# Patient Record
Sex: MALE | Race: WHITE | NOT HISPANIC OR LATINO | URBAN - METROPOLITAN AREA
[De-identification: names, ages, dates, MRNs, and addresses within clinical notes are randomized per-mention and may not be internally consistent; named-entity substitution may affect disease eponyms.]

---

## 2021-07-16 ENCOUNTER — LAB OUTSIDE AN ENCOUNTER (OUTPATIENT)
Dept: URBAN - METROPOLITAN AREA TELEHEALTH 2 | Facility: TELEHEALTH | Age: 36
End: 2021-07-16

## 2021-07-16 ENCOUNTER — OFFICE VISIT (OUTPATIENT)
Dept: URBAN - METROPOLITAN AREA TELEHEALTH 2 | Facility: TELEHEALTH | Age: 36
End: 2021-07-16
Payer: COMMERCIAL

## 2021-07-16 DIAGNOSIS — K86.0 ALCOHOL-INDUCED CHRONIC PANCREATITIS: ICD-10-CM

## 2021-07-16 DIAGNOSIS — R79.89 ELEVATED LIVER FUNCTION TESTS: ICD-10-CM

## 2021-07-16 DIAGNOSIS — R93.2 ABNORMAL FINDING ON IMAGING OF LIVER: ICD-10-CM

## 2021-07-16 PROCEDURE — 83516 IMMUNOASSAY NONANTIBODY: CPT | Performed by: INTERNAL MEDICINE

## 2021-07-16 PROCEDURE — 99205 OFFICE O/P NEW HI 60 MIN: CPT | Performed by: INTERNAL MEDICINE

## 2021-07-16 RX ORDER — NITROFURANTOIN 25 MG/5ML
SUSPENSION ORAL
Qty: 0 | Refills: 0 | Status: ACTIVE | COMMUNITY
Start: 1900-01-01

## 2021-07-16 NOTE — HPI-OTHER HISTORIES
August 22, 2018 The patient is a 33 year old /White male , who presents on referral from Ramya Taylor MD , for a gastroenterology 2nd opinion evaluation for established chronic pancreatitis. A copy of this document will be sent to the referring provider. The patient has symptoms of abdominal pain, weight loss, and diarrhea. Patient has not records from [place] present for me to review. States he has moderate to severe epogastric and left sided abdominal pain and nausea. Also with 2-5 loose stools daily.  He has not had a cholecystectomy, and has a prior use of alcohol as reviewed in social history.  The patient was diagnosed after presenting to [place] ED 12/2017. He states he has never been admitted to the hospital. ED referred him to [facility] [place]. He states he had tests ordered including an ultrasound and bloodwork. He was placed on pancreatic enzymes but he states he is having worse abdominal pain. he admits drinking 1 beer last week. Last imaging was a CT through the ED 8/8. He has not had an EUS. He has not seen pain management- PMD giving him oxycodone for pain management. He notes being told he has a portal vein thrombus and is scheduled to see Dr Piedra about this on Monday.

## 2021-07-16 NOTE — HPI-TODAY'S VISIT:
Mr. Kwaku Mallory is a 35 year old male here for chronic pancreatitis. He first started having pancreatitis in 2017. This was thought to be related to alcohol. In September 2018, he had severe hemorrhagic pancreatitis with fluid collections. He required IR embolization and drains. He has done well since this time. He has been sober for about a year. He is no longer on pancreatic enzymes. He denies any abdominal pain or diarrhea. He was hospatilized for sepsis in March. These outside records were reviewed. He had a CT scan at that time that scattered calcifications of the pancreas and mild pancreatic ductal dilatation. It also suggested a nodular liver, enlarged spleen and paraesophageal varices. He has never been told he has cirrhosis. His , AST 98, , bilirubin 0.3, and platelets 92.  Patient denies any family history of liver disease. Patient's family is local. He recalls being told in the past he had hepatitis. Patient denies any abdominal pain, swelling, mental changes, or increase in bleeding. He does bruise easily. CS

## 2021-08-22 ENCOUNTER — WEB ENCOUNTER (OUTPATIENT)
Dept: URBAN - METROPOLITAN AREA CLINIC 40 | Facility: CLINIC | Age: 36
End: 2021-08-22

## 2021-09-17 ENCOUNTER — OFFICE VISIT (OUTPATIENT)
Dept: URBAN - METROPOLITAN AREA MEDICAL CENTER 1 | Facility: MEDICAL CENTER | Age: 36
End: 2021-09-17
Payer: COMMERCIAL

## 2021-09-17 DIAGNOSIS — K31.89 ACQUIRED DEFORMITY OF DUODENUM: ICD-10-CM

## 2021-09-17 DIAGNOSIS — R93.3 ABN FINDINGS-GI TRACT: ICD-10-CM

## 2021-09-17 PROCEDURE — 43235 EGD DIAGNOSTIC BRUSH WASH: CPT | Performed by: INTERNAL MEDICINE

## 2021-10-20 ENCOUNTER — OFFICE VISIT (OUTPATIENT)
Dept: URBAN - NONMETROPOLITAN AREA CLINIC 2 | Facility: CLINIC | Age: 36
End: 2021-10-20

## 2021-12-01 ENCOUNTER — WEB ENCOUNTER (OUTPATIENT)
Dept: URBAN - NONMETROPOLITAN AREA CLINIC 2 | Facility: CLINIC | Age: 36
End: 2021-12-01

## 2021-12-01 ENCOUNTER — OFFICE VISIT (OUTPATIENT)
Dept: URBAN - NONMETROPOLITAN AREA CLINIC 2 | Facility: CLINIC | Age: 36
End: 2021-12-01
Payer: COMMERCIAL

## 2021-12-01 ENCOUNTER — LAB OUTSIDE AN ENCOUNTER (OUTPATIENT)
Dept: URBAN - NONMETROPOLITAN AREA CLINIC 2 | Facility: CLINIC | Age: 36
End: 2021-12-01

## 2021-12-01 VITALS
SYSTOLIC BLOOD PRESSURE: 114 MMHG | HEART RATE: 72 BPM | TEMPERATURE: 97.8 F | WEIGHT: 138.6 LBS | HEIGHT: 67 IN | BODY MASS INDEX: 21.75 KG/M2 | DIASTOLIC BLOOD PRESSURE: 77 MMHG

## 2021-12-01 DIAGNOSIS — K86.0 ALCOHOL-INDUCED CHRONIC PANCREATITIS: ICD-10-CM

## 2021-12-01 DIAGNOSIS — R79.89 ELEVATED LIVER FUNCTION TESTS: ICD-10-CM

## 2021-12-01 DIAGNOSIS — K86.89 CALCULUS OF PANCREATIC DUCT: ICD-10-CM

## 2021-12-01 DIAGNOSIS — R93.2 ABNORMAL FINDING ON IMAGING OF LIVER: ICD-10-CM

## 2021-12-01 PROCEDURE — 99214 OFFICE O/P EST MOD 30 MIN: CPT | Performed by: NURSE PRACTITIONER

## 2021-12-01 RX ORDER — NITROFURANTOIN 25 MG/5ML
SUSPENSION ORAL
Qty: 0 | Refills: 0 | Status: ACTIVE | COMMUNITY
Start: 1900-01-01

## 2021-12-01 NOTE — HPI-OTHER HISTORIES
August 22, 2018 The patient is a 33 year old /White male , who presents on referral from Ramya Taylor MD , for a gastroenterology 2nd opinion evaluation for established chronic pancreatitis. A copy of this document will be sent to the referring provider. The patient has symptoms of abdominal pain, weight loss, and diarrhea. Patient has not records from [place] present for me to review. States he has moderate to severe epogastric and left sided abdominal pain and nausea. Also with 2-5 loose stools daily.  He has not had a cholecystectomy, and has a prior use of alcohol as reviewed in social history.  The patient was diagnosed after presenting to [place] ED 12/2017. He states he has never been admitted to the hospital. ED referred him to [facility] [place]. He states he had tests ordered including an ultrasound and bloodwork. He was placed on pancreatic enzymes but he states he is having worse abdominal pain. he admits drinking 1 beer last week. Last imaging was a CT through the ED 8/8. He has not had an EUS. He has not seen pain management- PMD giving him oxycodone for pain management. He notes being told he has a portal vein thrombus and is scheduled to see Dr Piedra about this on Monday.   7/16/2021 Mr. Kwaku Mallory is a 35 year old male here for chronic pancreatitis. He first started having pancreatitis in 2017. This was thought to be related to alcohol. In September 2018, he had severe hemorrhagic pancreatitis with fluid collections. He required IR embolization and drains. He has done well since this time. He has been sober for about a year. He is no longer on pancreatic enzymes. He denies any abdominal pain or diarrhea. He was hospatilized for sepsis in March. These outside records were reviewed. He had a CT scan at that time that scattered calcifications of the pancreas and mild pancreatic ductal dilatation. It also suggested a nodular liver, enlarged spleen and paraesophageal varices. He has never been told he has cirrhosis. His , AST 98, , bilirubin 0.3, and platelets 92.  Patient denies any family history of liver disease. Patient's family is local. He recalls being told in the past he had hepatitis. Patient denies any abdominal pain, swelling, mental changes, or increase in bleeding. He does bruise easily. CS

## 2021-12-01 NOTE — HPI-TODAY'S VISIT:
12/1/2021 Mr. Kwaku Mallory is here for f/u of chronic pancreatitis and imaging suggestive of cirrhosis. He first started having pancreatitis in 2017. This was thought to be related to alcohol. In September 2018, he had severe hemorrhagic pancreatitis with fluid collections. He required IR embolization and drains. He has done well since this time.  He is no longer on pancreatic enzymes. He denies any abdominal pain or diarrhea. He has been sober for over a year. He was hospatilized for sepsis in March. He had a CT scan at that time that scattered calcifications of the pancreas and mild pancreatic ductal dilatation. It also suggested a nodular liver, enlarged spleen and paraesophageal varices. Chronic liver work up was ordered, repeat CT scan, and EGD for varix screening. He did not get the labs. HIs CT scan scan pancreatic ductal dilatation with a pancreatic ductal stone. There were no masses or lesions in the liver. There was no comment on the liver looking nodular or cirrhotic. Patient denies any abdominal pain, swelling, mental changes, or increase in bleeding. He does bruise easily. CS

## 2021-12-03 LAB
ACTIN (SMOOTH MUSCLE) ANTIBODY: 7
AFP, SERUM, TUMOR MARKER: 2.4
ALBUMIN: 4.8
ALKALINE PHOSPHATASE: 165
ALPHA-1-ANTITRYPSIN, SERUM: 123
ALT (SGPT): 19
ANA DIRECT: NEGATIVE
AST (SGOT): 27
BILIRUBIN, DIRECT: 0.14
BILIRUBIN, TOTAL: 0.4
BUN/CREATININE RATIO: 21
BUN: 15
CARBON DIOXIDE, TOTAL: 24
CERULOPLASMIN: 23.1
CHLORIDE: 102
CREATININE: 0.72
EGFR IF AFRICN AM: 141
EGFR IF NONAFRICN AM: 122
GLUCOSE: 69
HBSAG SCREEN: NEGATIVE
HCV AB: 0.3
HEMATOCRIT: 45
HEMOGLOBIN: 15.6
HEP A AB, IGM: NEGATIVE
HEP A AB, TOTAL: NEGATIVE
HEP B CORE AB, TOT: NEGATIVE
HEP B SURFACE AB, QUAL: REACTIVE
INR: 1.1
INTERPRETATION: (no result)
INTERPRETATION:: (no result)
IRON BIND.CAP.(TIBC): 364
IRON SATURATION: 39
IRON: 143
MCH: 32.1
MCHC: 34.7
MCV: 93
MITOCHONDRIAL (M2) ANTIBODY: <20
NRBC: (no result)
PLATELETS: 119
POTASSIUM: 4.1
PROTEIN, TOTAL: 7
PROTHROMBIN TIME: 11.1
RBC: 4.86
RDW: 12.8
RFX TO HBC IGM: (no result)
SODIUM: 140
T-TRANSGLUTAMINASE (TTG) IGA: <2
TSH: 3.43
UIBC: 221
WBC: 4.5

## 2021-12-17 ENCOUNTER — OFFICE VISIT (OUTPATIENT)
Dept: URBAN - METROPOLITAN AREA MEDICAL CENTER 1 | Facility: MEDICAL CENTER | Age: 36
End: 2021-12-17
Payer: COMMERCIAL

## 2021-12-17 DIAGNOSIS — K86.89 ACUTE PANCREATIC FLUID COLLECTION: ICD-10-CM

## 2021-12-17 PROCEDURE — 43274 ERCP DUCT STENT PLACEMENT: CPT | Performed by: INTERNAL MEDICINE

## 2021-12-17 PROCEDURE — 43237 ENDOSCOPIC US EXAM ESOPH: CPT | Performed by: INTERNAL MEDICINE

## 2021-12-17 PROCEDURE — 74329 X-RAY FOR PANCREAS ENDOSCOPY: CPT | Performed by: INTERNAL MEDICINE

## 2021-12-27 ENCOUNTER — TELEPHONE ENCOUNTER (OUTPATIENT)
Dept: URBAN - METROPOLITAN AREA CLINIC 23 | Facility: CLINIC | Age: 36
End: 2021-12-27

## 2021-12-28 ENCOUNTER — OUT OF OFFICE VISIT (OUTPATIENT)
Dept: URBAN - METROPOLITAN AREA MEDICAL CENTER 1 | Facility: MEDICAL CENTER | Age: 36
End: 2021-12-28
Payer: COMMERCIAL

## 2021-12-28 DIAGNOSIS — K85.20 ALCOHOL INDUCED ACUTE PANCREATITIS: ICD-10-CM

## 2021-12-28 DIAGNOSIS — K59.09 CHANGE IN BOWEL MOVEMENTS INTERMITTENT CONSTIPATION. URGENCY IN THE MORNING.: ICD-10-CM

## 2021-12-28 DIAGNOSIS — K86.89 ACUTE PANCREATIC FLUID COLLECTION: ICD-10-CM

## 2021-12-28 DIAGNOSIS — K86.0 ALCOHOL-INDUCED CHRONIC PANCREATITIS: ICD-10-CM

## 2021-12-28 PROCEDURE — 99222 1ST HOSP IP/OBS MODERATE 55: CPT | Performed by: INTERNAL MEDICINE

## 2021-12-28 PROCEDURE — G8427 DOCREV CUR MEDS BY ELIG CLIN: HCPCS | Performed by: INTERNAL MEDICINE

## 2022-01-18 ENCOUNTER — OFFICE VISIT (OUTPATIENT)
Dept: URBAN - NONMETROPOLITAN AREA CLINIC 13 | Facility: CLINIC | Age: 37
End: 2022-01-18

## 2022-01-18 RX ORDER — NITROFURANTOIN 25 MG/5ML
SUSPENSION ORAL
Qty: 0 | Refills: 0 | COMMUNITY
Start: 1900-01-01

## 2022-01-18 NOTE — HPI-OTHER HISTORIES
August 22, 2018 The patient is a 33 year old /White male , who presents on referral from Ramya Taylor MD , for a gastroenterology 2nd opinion evaluation for established chronic pancreatitis. A copy of this document will be sent to the referring provider. The patient has symptoms of abdominal pain, weight loss, and diarrhea. Patient has not records from [place] present for me to review. States he has moderate to severe epogastric and left sided abdominal pain and nausea. Also with 2-5 loose stools daily.  He has not had a cholecystectomy, and has a prior use of alcohol as reviewed in social history.  The patient was diagnosed after presenting to [place] ED 12/2017. He states he has never been admitted to the hospital. ED referred him to [facility]. He states he had tests ordered including an ultrasound and bloodwork. He was placed on pancreatic enzymes but he states he is having worse abdominal pain. he admits drinking 1 beer last week. Last imaging was a CT through the ED 8/8. He has not had an EUS. He has not seen pain management- PMD giving him oxycodone for pain management. He notes being told he has a portal vein thrombus and is scheduled to see Dr Piedra about this on Monday.   7/16/2021 Mr. Kwaku Mallory is a 35 year old male here for chronic pancreatitis. He first started having pancreatitis in 2017. This was thought to be related to alcohol. In September 2018, he had severe hemorrhagic pancreatitis with fluid collections. He required IR embolization and drains. He has done well since this time. He has been sober for about a year. He is no longer on pancreatic enzymes. He denies any abdominal pain or diarrhea. He was hospatilized for sepsis in March. These outside records were reviewed. He had a CT scan at that time that scattered calcifications of the pancreas and mild pancreatic ductal dilatation. It also suggested a nodular liver, enlarged spleen and paraesophageal varices. He has never been told he has cirrhosis. His , AST 98, , bilirubin 0.3, and platelets 92.  Patient denies any family history of liver disease. Patient's family is local. He recalls being told in the past he had hepatitis. Patient denies any abdominal pain, swelling, mental changes, or increase in bleeding. He does bruise easily. CS

## 2022-02-18 ENCOUNTER — OFFICE VISIT (OUTPATIENT)
Dept: URBAN - METROPOLITAN AREA TELEHEALTH 2 | Facility: TELEHEALTH | Age: 37
End: 2022-02-18

## 2022-02-18 RX ORDER — NITROFURANTOIN 25 MG/5ML
SUSPENSION ORAL
Qty: 0 | Refills: 0 | COMMUNITY
Start: 1900-01-01

## 2022-02-18 NOTE — HPI-OTHER HISTORIES
August 22, 2018 The patient is a 33 year old /White male , who presents on referral from Ramya Taylor MD , for a gastroenterology 2nd opinion evaluation for established chronic pancreatitis. A copy of this document will be sent to the referring provider. The patient has symptoms of abdominal pain, weight loss, and diarrhea. Patient has not records from [place] present for me to review. States he has moderate to severe epogastric and left sided abdominal pain and nausea. Also with 2-5 loose stools daily.  He has not had a cholecystectomy, and has a prior use of alcohol as reviewed in social history.  The patient was diagnosed after presenting to [place] ED 12/2017. He states he has never been admitted to the hospital. ED referred him to [facility] [place]. He states he had tests ordered including an ultrasound and bloodwork. He was placed on pancreatic enzymes but he states he is having worse abdominal pain. he admits drinking 1 beer last week. Last imaging was a CT through the ED 8/8. He has not had an EUS. He has not seen pain management- PMD giving him oxycodone for pain management. He notes being told he has a portal vein thrombus and is scheduled to see Dr Piedra about this on Monday.   7/16/2021 Mr. Kwaku Mallory is a 35 year old male here for chronic pancreatitis. He first started having pancreatitis in 2017. This was thought to be related to alcohol. In September 2018, he had severe hemorrhagic pancreatitis with fluid collections. He required IR embolization and drains. He has done well since this time. He has been sober for about a year. He is no longer on pancreatic enzymes. He denies any abdominal pain or diarrhea. He was hospatilized for sepsis in March. These outside records were reviewed. He had a CT scan at that time that scattered calcifications of the pancreas and mild pancreatic ductal dilatation. It also suggested a nodular liver, enlarged spleen and paraesophageal varices. He has never been told he has cirrhosis. His , AST 98, , bilirubin 0.3, and platelets 92.  Patient denies any family history of liver disease. Patient's family is local. He recalls being told in the past he had hepatitis. Patient denies any abdominal pain, swelling, mental changes, or increase in bleeding. He does bruise easily. CS

## 2022-04-19 ENCOUNTER — OFFICE VISIT (OUTPATIENT)
Dept: URBAN - NONMETROPOLITAN AREA CLINIC 13 | Facility: CLINIC | Age: 37
End: 2022-04-19
Payer: COMMERCIAL

## 2022-04-19 ENCOUNTER — LAB OUTSIDE AN ENCOUNTER (OUTPATIENT)
Dept: URBAN - NONMETROPOLITAN AREA CLINIC 13 | Facility: CLINIC | Age: 37
End: 2022-04-19

## 2022-04-19 DIAGNOSIS — K86.89 CALCULUS OF PANCREATIC DUCT: ICD-10-CM

## 2022-04-19 DIAGNOSIS — K86.0 ALCOHOL-INDUCED CHRONIC PANCREATITIS: ICD-10-CM

## 2022-04-19 DIAGNOSIS — R79.89 ELEVATED LIVER FUNCTION TESTS: ICD-10-CM

## 2022-04-19 PROCEDURE — 99214 OFFICE O/P EST MOD 30 MIN: CPT | Performed by: INTERNAL MEDICINE

## 2022-04-19 RX ORDER — NITROFURANTOIN 25 MG/5ML
SUSPENSION ORAL
Qty: 0 | Refills: 0 | COMMUNITY
Start: 1900-01-01

## 2022-04-19 NOTE — HPI-OTHER HISTORIES
August 22, 2018 The patient is a 33 year old /White male , who presents on referral from Ramya Taylor MD , for a gastroenterology 2nd opinion evaluation for established chronic pancreatitis. A copy of this document will be sent to the referring provider. The patient has symptoms of abdominal pain, weight loss, and diarrhea. Patient has not records from [place] present for me to review. States he has moderate to severe epogastric and left sided abdominal pain and nausea. Also with 2-5 loose stools daily.  He has not had a cholecystectomy, and has a prior use of alcohol as reviewed in social history.  The patient was diagnosed after presenting to [place] ED 12/2017. He states he has never been admitted to the hospital. ED referred him to [facility]. He states he had tests ordered including an ultrasound and bloodwork. He was placed on pancreatic enzymes but he states he is having worse abdominal pain. he admits drinking 1 beer last week. Last imaging was a CT through the ED 8/8. He has not had an EUS. He has not seen pain management- PMD giving him oxycodone for pain management. He notes being told he has a portal vein thrombus and is scheduled to see Dr Piedra about this on Monday.   7/16/2021 Mr. Kwaku Mallory is a 35 year old male here for chronic pancreatitis. He first started having pancreatitis in 2017. This was thought to be related to alcohol. In September 2018, he had severe hemorrhagic pancreatitis with fluid collections. He required IR embolization and drains. He has done well since this time. He has been sober for about a year. He is no longer on pancreatic enzymes. He denies any abdominal pain or diarrhea. He was hospatilized for sepsis in March. These outside records were reviewed. He had a CT scan at that time that scattered calcifications of the pancreas and mild pancreatic ductal dilatation. It also suggested a nodular liver, enlarged spleen and paraesophageal varices. He has never been told he has cirrhosis. His , AST 98, , bilirubin 0.3, and platelets 92.  Patient denies any family history of liver disease. Patient's family is local. He recalls being told in the past he had hepatitis. Patient denies any abdominal pain, swelling, mental changes, or increase in bleeding. He does bruise easily.    9/2021 EGD: no esophageal varices, mildly prominent veins in stomach possible gastric varices  12/1/2021 Mr. Kwaku Mallory is here for f/u of chronic pancreatitis and imaging suggestive of cirrhosis. He first started having pancreatitis in 2017. This was thought to be related to alcohol. In September 2018, he had severe hemorrhagic pancreatitis with fluid collections. He required IR embolization and drains. He has done well since this time.  He is no longer on pancreatic enzymes. He denies any abdominal pain or diarrhea. He has been sober for over a year. He was hospatilized for sepsis in March. He had a CT scan at that time that scattered calcifications of the pancreas and mild pancreatic ductal dilatation. It also suggested a nodular liver, enlarged spleen and paraesophageal varices. Chronic liver work up was ordered, repeat CT scan, and EGD for varix screening. He did not get the labs. HIs CT scan scan pancreatic ductal dilatation with a pancreatic ductal stone. There were no masses or lesions in the liver. There was no comment on the liver looking nodular or cirrhotic. Patient denies any abdominal pain, swelling, mental changes, or increase in bleeding. He does bruise easily. CS   12/17/2021 EUS: endoscopic changes suggesting mild to moderate chronic pancreatitis, pancreatic stone in body of pancreas, dilated duct in body 6mm, peripancreatic varices ERCP: tortuous narrowed proximal pancreatic duct concerning for stricture, stent placed 5mm x 5cm single pigtail placed

## 2022-04-19 NOTE — HPI-TODAY'S VISIT:
4/19/2022 Mr. Kwaku Mallory is here for f/u of chronic pancreatitis.  He first started having pancreatitis in 2017 secondary to alcohol. In September 2018, he had severe hemorrhagic pancreatitis with fluid collections. He required IR embolization and drains. In March 2021, he had a CT scan with scattered calcifications of the pancreas and mild pancreatic ductal dilatation. It also suggested a nodular liver, enlarged spleen and paraesophageal varices.  CT scan scan pancreatic ductal dilatation with a pancreatic ductal stone. There were no masses or lesions in the liver. EGD with mild promiment gastric varices. Chronic work up negative. US elastography negative for cirrhossis. Due to stones, ERCP and EUS was done with stenting. He then developed pancreatitis. He has been doing well since January. He denies any abdominal pain or jaundice. He is taking zenpep with only some of his meals. it  has a SE for constipation. he is working full time at [employer]. Overall, he is feeling well. CS

## 2022-05-13 ENCOUNTER — OFFICE VISIT (OUTPATIENT)
Dept: URBAN - METROPOLITAN AREA MEDICAL CENTER 1 | Facility: MEDICAL CENTER | Age: 37
End: 2022-05-13
Payer: COMMERCIAL

## 2022-05-13 DIAGNOSIS — K86.89 ACUTE PANCREATIC FLUID COLLECTION: ICD-10-CM

## 2022-05-13 DIAGNOSIS — Z46.59 ENCOUNTER FOR FITTING AND ADJUSTMENT OF OTHER GASTROINTESTINAL APPLIANCE AND DEVICE: ICD-10-CM

## 2022-05-13 PROCEDURE — 74329 X-RAY FOR PANCREAS ENDOSCOPY: CPT | Performed by: INTERNAL MEDICINE

## 2022-05-13 PROCEDURE — 43275 ERCP REMOVE FORGN BODY DUCT: CPT | Performed by: INTERNAL MEDICINE

## 2022-05-13 RX ORDER — NITROFURANTOIN 25 MG/5ML
SUSPENSION ORAL
Qty: 0 | Refills: 0 | COMMUNITY
Start: 1900-01-01

## 2022-05-23 ENCOUNTER — TELEPHONE ENCOUNTER (OUTPATIENT)
Dept: URBAN - NONMETROPOLITAN AREA CLINIC 13 | Facility: CLINIC | Age: 37
End: 2022-05-23

## 2022-05-23 RX ORDER — PANCRELIPASE LIPASE, PANCRELIPASE PROTEASE, PANCRELIPASE AMYLASE 20000; 63000; 84000 [USP'U]/1; [USP'U]/1; [USP'U]/1
2 PILLS WITH EACH MEAL AND ONE WITH EACH SNACK CAPSULE, DELAYED RELEASE ORAL
Qty: 240 | Refills: 6

## 2022-06-09 ENCOUNTER — OFFICE VISIT (OUTPATIENT)
Dept: URBAN - NONMETROPOLITAN AREA CLINIC 13 | Facility: CLINIC | Age: 37
End: 2022-06-09

## 2022-07-07 ENCOUNTER — OFFICE VISIT (OUTPATIENT)
Dept: URBAN - NONMETROPOLITAN AREA CLINIC 13 | Facility: CLINIC | Age: 37
End: 2022-07-07
Payer: COMMERCIAL

## 2022-07-07 ENCOUNTER — LAB OUTSIDE AN ENCOUNTER (OUTPATIENT)
Dept: URBAN - NONMETROPOLITAN AREA CLINIC 13 | Facility: CLINIC | Age: 37
End: 2022-07-07

## 2022-07-07 VITALS
BODY MASS INDEX: 20.91 KG/M2 | TEMPERATURE: 97.9 F | HEIGHT: 67 IN | SYSTOLIC BLOOD PRESSURE: 108 MMHG | DIASTOLIC BLOOD PRESSURE: 69 MMHG | WEIGHT: 133.2 LBS | HEART RATE: 65 BPM

## 2022-07-07 DIAGNOSIS — K86.0 ALCOHOL-INDUCED CHRONIC PANCREATITIS: ICD-10-CM

## 2022-07-07 DIAGNOSIS — K86.89 CALCULUS OF PANCREATIC DUCT: ICD-10-CM

## 2022-07-07 DIAGNOSIS — R79.89 ELEVATED LIVER FUNCTION TESTS: ICD-10-CM

## 2022-07-07 PROCEDURE — 99214 OFFICE O/P EST MOD 30 MIN: CPT | Performed by: NURSE PRACTITIONER

## 2022-07-07 RX ORDER — PANCRELIPASE LIPASE, PANCRELIPASE PROTEASE, PANCRELIPASE AMYLASE 20000; 63000; 84000 [USP'U]/1; [USP'U]/1; [USP'U]/1
2 PILLS WITH EACH MEAL AND ONE WITH EACH SNACK CAPSULE, DELAYED RELEASE ORAL
Qty: 240 | Refills: 6 | Status: ACTIVE | COMMUNITY

## 2022-07-07 RX ORDER — NITROFURANTOIN 25 MG/5ML
SUSPENSION ORAL
Qty: 0 | Refills: 0 | Status: ACTIVE | COMMUNITY
Start: 1900-01-01

## 2022-07-07 RX ORDER — PANCRELIPASE LIPASE, PANCRELIPASE PROTEASE, PANCRELIPASE AMYLASE 20000; 63000; 84000 [USP'U]/1; [USP'U]/1; [USP'U]/1
2 PILLS WITH EACH MEAL AND ONE WITH EACH SNACK CAPSULE, DELAYED RELEASE ORAL
Qty: 240 | Refills: 11

## 2022-07-07 NOTE — HPI-OTHER HISTORIES
August 22, 2018 The patient is a 33 year old /White male , who presents on referral from Ramya Taylor MD , for a gastroenterology 2nd opinion evaluation for established chronic pancreatitis. A copy of this document will be sent to the referring provider. The patient has symptoms of abdominal pain, weight loss, and diarrhea. Patient has not records from [place] present for me to review. States he has moderate to severe epogastric and left sided abdominal pain and nausea. Also with 2-5 loose stools daily.  He has not had a cholecystectomy, and has a prior use of alcohol as reviewed in social history.  The patient was diagnosed after presenting to [place] ED 12/2017. He states he has never been admitted to the hospital. ED referred him to [facility] [place]. He states he had tests ordered including an ultrasound and bloodwork. He was placed on pancreatic enzymes but he states he is having worse abdominal pain. he admits drinking 1 beer last week. Last imaging was a CT through the ED 8/8. He has not had an EUS. He has not seen pain management- PMD giving him oxycodone for pain management. He notes being told he has a portal vein thrombus and is scheduled to see Dr Piedra about this on Monday.   7/16/2021 Mr. Kwaku Mallory is a 35 year old male here for chronic pancreatitis. He first started having pancreatitis in 2017. This was thought to be related to alcohol. In September 2018, he had severe hemorrhagic pancreatitis with fluid collections. He required IR embolization and drains. He has done well since this time. He has been sober for about a year. He is no longer on pancreatic enzymes. He denies any abdominal pain or diarrhea. He was hospatilized for sepsis in March. These outside records were reviewed. He had a CT scan at that time that scattered calcifications of the pancreas and mild pancreatic ductal dilatation. It also suggested a nodular liver, enlarged spleen and paraesophageal varices. He has never been told he has cirrhosis. His , AST 98, , bilirubin 0.3, and platelets 92.  Patient denies any family history of liver disease. Patient's family is local. He recalls being told in the past he had hepatitis. Patient denies any abdominal pain, swelling, mental changes, or increase in bleeding. He does bruise easily.    9/2021 EGD: no esophageal varices, mildly prominent veins in stomach possible gastric varices  12/1/2021 Mr. Kwaku Mallory is here for f/u of chronic pancreatitis and imaging suggestive of cirrhosis. He first started having pancreatitis in 2017. This was thought to be related to alcohol. In September 2018, he had severe hemorrhagic pancreatitis with fluid collections. He required IR embolization and drains. He has done well since this time.  He is no longer on pancreatic enzymes. He denies any abdominal pain or diarrhea. He has been sober for over a year. He was hospatilized for sepsis in March. He had a CT scan at that time that scattered calcifications of the pancreas and mild pancreatic ductal dilatation. It also suggested a nodular liver, enlarged spleen and paraesophageal varices. Chronic liver work up was ordered, repeat CT scan, and EGD for varix screening. He did not get the labs. HIs CT scan scan pancreatic ductal dilatation with a pancreatic ductal stone. There were no masses or lesions in the liver. There was no comment on the liver looking nodular or cirrhotic. Patient denies any abdominal pain, swelling, mental changes, or increase in bleeding. He does bruise easily. CS   12/17/2021 EUS: endoscopic changes suggesting mild to moderate chronic pancreatitis, pancreatic stone in body of pancreas, dilated duct in body 6mm, peripancreatic varices ERCP: tortuous narrowed proximal pancreatic duct concerning for stricture, stent placed 5mm x 5cm single pigtail placed  4/19/2022 Mr. Kwaku Mallory is here for f/u of chronic pancreatitis.  He first started having pancreatitis in 2017 secondary to alcohol. In September 2018, he had severe hemorrhagic pancreatitis with fluid collections. He required IR embolization and drains. In March 2021, he had a CT scan with scattered calcifications of the pancreas and mild pancreatic ductal dilatation. It also suggested a nodular liver, enlarged spleen and paraesophageal varices.  CT scan scan pancreatic ductal dilatation with a pancreatic ductal stone. There were no masses or lesions in the liver. EGD with mild promiment gastric varices. Chronic work up negative. US elastography negative for cirrhossis. Due to stones, ERCP and EUS was done with stenting. He then developed pancreatitis. He has been doing well since January. He denies any abdominal pain or jaundice. He is taking zenpep with only some of his meals. it  has a SE for constipation. he is working full time at [employer]. Overall, he is feeling well. CS  5/13/2022 ERCP: Removal of indwelling single pigtail pancreatic stent. Unsuccessful placement of stent due poor positioning and looping to proximal pancreatic duct

## 2022-11-16 ENCOUNTER — OFFICE VISIT (OUTPATIENT)
Dept: URBAN - NONMETROPOLITAN AREA CLINIC 2 | Facility: CLINIC | Age: 37
End: 2022-11-16

## 2023-05-12 ENCOUNTER — TELEPHONE ENCOUNTER (OUTPATIENT)
Dept: URBAN - NONMETROPOLITAN AREA CLINIC 2 | Facility: CLINIC | Age: 38
End: 2023-05-12

## 2023-05-12 RX ORDER — PANCRELIPASE 36000; 180000; 114000 [USP'U]/1; [USP'U]/1; [USP'U]/1
2 WITH EACH MEAL AND 1 WITH EACH SNACK CAPSULE, DELAYED RELEASE PELLETS ORAL THREE TIMES A DAY
Qty: 240 | Refills: 11 | OUTPATIENT
Start: 2023-05-12 | End: 2024-05-06

## 2023-05-16 ENCOUNTER — TELEPHONE ENCOUNTER (OUTPATIENT)
Dept: URBAN - NONMETROPOLITAN AREA CLINIC 2 | Facility: CLINIC | Age: 38
End: 2023-05-16

## 2023-05-18 ENCOUNTER — LAB OUTSIDE AN ENCOUNTER (OUTPATIENT)
Dept: URBAN - NONMETROPOLITAN AREA CLINIC 13 | Facility: CLINIC | Age: 38
End: 2023-05-18

## 2023-05-18 ENCOUNTER — LAB OUTSIDE AN ENCOUNTER (OUTPATIENT)
Dept: URBAN - NONMETROPOLITAN AREA CLINIC 2 | Facility: CLINIC | Age: 38
End: 2023-05-18

## 2023-05-18 ENCOUNTER — OFFICE VISIT (OUTPATIENT)
Dept: URBAN - NONMETROPOLITAN AREA CLINIC 13 | Facility: CLINIC | Age: 38
End: 2023-05-18
Payer: COMMERCIAL

## 2023-05-18 VITALS
WEIGHT: 138.2 LBS | HEIGHT: 67 IN | HEART RATE: 63 BPM | SYSTOLIC BLOOD PRESSURE: 110 MMHG | DIASTOLIC BLOOD PRESSURE: 68 MMHG | BODY MASS INDEX: 21.69 KG/M2

## 2023-05-18 DIAGNOSIS — K86.89 CALCULUS OF PANCREATIC DUCT: ICD-10-CM

## 2023-05-18 DIAGNOSIS — K86.0 ALCOHOL-INDUCED CHRONIC PANCREATITIS: ICD-10-CM

## 2023-05-18 DIAGNOSIS — R79.89 ELEVATED LIVER FUNCTION TESTS: ICD-10-CM

## 2023-05-18 PROBLEM — 235952002: Status: ACTIVE | Noted: 2021-07-16

## 2023-05-18 LAB
A/G RATIO: 1.8
ALBUMIN: 4.6
ALKALINE PHOSPHATASE: 149
ALT (SGPT): 32
ANION GAP: 9
AST (SGOT): 30
BASOPHILS AUTOMATED ABSOLUTE COUNT: 0
BASOPHILS RELATIVE PERCENT: 0.4
BILIRUBIN TOTAL: 0.6
BLOOD UREA NITROGEN: 13
BUN / CREAT RATIO: 14
CALCIUM: 9.9
CHLORIDE: 101
CO2: 33
CREATININE, SERUM: 0.92
EGFR (CKD-EPI): >60
EOSINOPHILS AUTOMATED ABSOLUTE COUNT: 0.2
EOSINOPHILS RELATIVE PERCENT: 2.9
GLUCOSE: 64
HEMATOCRIT: 45.2
HEMOGLOBIN: 15.7
LYMPHOCYTES AUTOMATED ABSOLUTE COUNT: 1.9
LYMPHOCYTES RELATIVE PERCENT: 33
MEAN CORPUSCULAR HEMOGLOBIN CONC: 34.8
MEAN CORPUSCULAR HEMOGLOBIN: 32.4
MEAN CORPUSCULAR VOLUME: 93.1
MEAN PLATELET VOLUME: 9.6
MONOCYTES AUTOMATED ABSOLUTE COUNT: 0.6
MONOCYTES RELATIVE PERCENT: 10.4
NEUTROPHILS AUTOMATED ABSOLUTE: 3.1
NEUTROPHILS RELATIVE PERCENT: 53.3
PLATELET COUNT: 127
POTASSIUM: 4.1
PROTEIN TOTAL: 7.2
RED BLOOD CELL COUNT: 4.86
RED CELL DISTRIBUTION WIDTH: 13.6
SODIUM: 139
WHITE BLOOD CELL COUNT: 5.9

## 2023-05-18 PROCEDURE — 99214 OFFICE O/P EST MOD 30 MIN: CPT | Performed by: NURSE PRACTITIONER

## 2023-05-18 RX ORDER — PANCRELIPASE 36000; 180000; 114000 [USP'U]/1; [USP'U]/1; [USP'U]/1
2 WITH EACH MEAL AND 1 WITH EACH SNACK CAPSULE, DELAYED RELEASE PELLETS ORAL THREE TIMES A DAY
Qty: 240 | Refills: 11 | Status: ACTIVE | COMMUNITY
Start: 2023-05-12 | End: 2024-05-06

## 2023-05-18 RX ORDER — PANCRELIPASE LIPASE, PANCRELIPASE PROTEASE, PANCRELIPASE AMYLASE 20000; 63000; 84000 [USP'U]/1; [USP'U]/1; [USP'U]/1
2 PILLS WITH EACH MEAL AND ONE WITH EACH SNACK CAPSULE, DELAYED RELEASE ORAL
Qty: 240 | Refills: 11 | Status: ON HOLD | COMMUNITY

## 2023-05-18 RX ORDER — NITROFURANTOIN 25 MG/5ML
SUSPENSION ORAL
Qty: 0 | Refills: 0 | Status: ACTIVE | COMMUNITY
Start: 1900-01-01

## 2023-05-18 RX ORDER — PANCRELIPASE 24000; 76000; 120000 [USP'U]/1; [USP'U]/1; [USP'U]/1
2 WITH EACH MEAL AND 1 WITH EACH SNACK CAPSULE, DELAYED RELEASE PELLETS ORAL THREE TIMES A DAY
Qty: 240 | Refills: 11
Start: 2023-05-12 | End: 2024-05-12

## 2023-05-18 NOTE — HPI-OTHER HISTORIES
CICU Patient progress note    Admission date:  6/26/2017  Intubation date:  NA      This is a 65 YO female pts with PMH significant for ALKA, Hypertension, DM type 2, Gout, Hypothyroidism, Chronic pain disorder and chronic opoid use disorder who was initially admitted to [facility] following presentation with AMS which responded to Narcan and was also treated with IVF for rhabdomyolysis, ARF and transferred to floor on 6/24/2017. Yesterday she developed increasingly worse SOB with escalating oxygen requirement which failed to improved with lasix and breathing treatment and was subsequently transferred early today to ICU and placed on BiPAP. She was also given lasix and put out over 3 liters of urine overnight. She was on BiPAP overnight (was unable to tolerate CPAP and switched back on BiPAP). Got 120 mg lasix overnight. She was started on antibiotics yesterday as well (leucocytosis, mild low grade fever and marginally elevated pro calcitonine).     Subjectively: She still feels SOB and is getting 50 liter of 80% oxygen. She complained of anxiety and lack of sleep overnight and was asking for medications for anxiety. Has not moved her bowel for two days now. Has put out net 4 liter total.    Problem list:  Acute hypoxic respiratory failure  Obstructive sleep apnea  Acute kidney injury-resolved  Rhabdomyolysis, improving CPK  Chronic pain syndrome  Altered mental status due to psychotropic meds and opiates-resolvede  Leucocytosis  Hypothyroidism  Hypokalemia  Elevated BNP  Hypertension, essential  Diabetes mellitus type 2      Discussion:  Neuro: Alert and oriented, anxiouse; no gross focal neurologic deficit  #. AMS- opoid and psycho trop medication over dose in the face of YASMANI; responded to narcan. Has resolved.  #. Chronic pain disorder  #. Long term Narcotic medication use  -Percocet for pain management with tylenol, Clonidine, duloxetine, pregabalin  Avoid  August 22, 2018 The patient is a 33 year old /White male , who presents on referral from Ramya Taylor MD , for a gastroenterology 2nd opinion evaluation for established chronic pancreatitis. A copy of this document will be sent to the referring provider. The patient has symptoms of abdominal pain, weight loss, and diarrhea. Patient has not records from [place] present for me to review. States he has moderate to severe epogastric and left sided abdominal pain and nausea. Also with 2-5 loose stools daily.  He has not had a cholecystectomy, and has a prior use of alcohol as reviewed in social history.  The patient was diagnosed after presenting to [place] ED 12/2017. He states he has never been admitted to the hospital. ED referred him to [facility]. He states he had tests ordered including an ultrasound and bloodwork. He was placed on pancreatic enzymes but he states he is having worse abdominal pain. he admits drinking 1 beer last week. Last imaging was a CT through the ED 8/8. He has not had an EUS. He has not seen pain management- PMD giving him oxycodone for pain management. He notes being told he has a portal vein thrombus and is scheduled to see Dr Piedra about this on Monday.   7/16/2021 Mr. Kwaku Mallory is a 35 year old male here for chronic pancreatitis. He first started having pancreatitis in 2017. This was thought to be related to alcohol. In September 2018, he had severe hemorrhagic pancreatitis with fluid collections. He required IR embolization and drains. He has done well since this time. He has been sober for about a year. He is no longer on pancreatic enzymes. He denies any abdominal pain or diarrhea. He was hospatilized for sepsis in March. These outside records were reviewed. He had a CT scan at that time that scattered calcifications of the pancreas and mild pancreatic ductal dilatation. It also suggested a nodular liver, enlarged spleen and paraesophageal varices. He has never been told he has cirrhosis. His , AST 98, , bilirubin 0.3, and platelets 92.  Patient denies any family history of liver disease. Patient's family is local. He recalls being told in the past he had hepatitis. Patient denies any abdominal pain, swelling, mental changes, or increase in bleeding. He does bruise easily.    9/2021 EGD: no esophageal varices, mildly prominent veins in stomach possible gastric varices  12/1/2021 Mr. Kwaku Mallory is here for f/u of chronic pancreatitis and imaging suggestive of cirrhosis. He first started having pancreatitis in 2017. This was thought to be related to alcohol. In September 2018, he had severe hemorrhagic pancreatitis with fluid collections. He required IR embolization and drains. He has done well since this time.  He is no longer on pancreatic enzymes. He denies any abdominal pain or diarrhea. He has been sober for over a year. He was hospatilized for sepsis in March. He had a CT scan at that time that scattered calcifications of the pancreas and mild pancreatic ductal dilatation. It also suggested a nodular liver, enlarged spleen and paraesophageal varices. Chronic liver work up was ordered, repeat CT scan, and EGD for varix screening. He did not get the labs. HIs CT scan scan pancreatic ductal dilatation with a pancreatic ductal stone. There were no masses or lesions in the liver. There was no comment on the liver looking nodular or cirrhotic. Patient denies any abdominal pain, swelling, mental changes, or increase in bleeding. He does bruise easily. CS   12/17/2021 EUS: endoscopic changes suggesting mild to moderate chronic pancreatitis, pancreatic stone in body of pancreas, dilated duct in body 6mm, peripancreatic varices ERCP: tortuous narrowed proximal pancreatic duct concerning for stricture, stent placed 5mm x 5cm single pigtail placed  4/19/2022 Mr. Kwaku Mallory is here for f/u of chronic pancreatitis.  He first started having pancreatitis in 2017 secondary to alcohol. In September 2018, he had severe hemorrhagic pancreatitis with fluid collections. He required IR embolization and drains. In March 2021, he had a CT scan with scattered calcifications of the pancreas and mild pancreatic ductal dilatation. It also suggested a nodular liver, enlarged spleen and paraesophageal varices.  CT scan scan pancreatic ductal dilatation with a pancreatic ductal stone. There were no masses or lesions in the liver. EGD with mild promiment gastric varices. Chronic work up negative. US elastography negative for cirrhossis. Due to stones, ERCP and EUS was done with stenting. He then developed pancreatitis. He has been doing well since January. He denies any abdominal pain or jaundice. He is taking zenpep with only some of his meals. it  has a SE for constipation. he is working full time at [employer]. Overall, he is feeling well. CS  5/13/2022 ERCP: Removal of indwelling single pigtail pancreatic stent. Unsuccessful placement of stent due poor positioning and looping to proximal pancreatic duct  7/7/2022 Mr. Kwaku Mallory is here for f/u of chronic pancreatitis.  Due to stones seen on CT scan last year, he had an ERCP and EUS with stenting in January. He had a repeat ERCP in May to remove the stent and stones. The stent was able to be removed but no stone was seen. Another stent was able to be placed. Today, he is feeling ok. He denies any acute pancreatitis symptoms. He denies any abdominal pain, diarrhea, weight loss or jaundice. He is taking zenpep with meals. He does not think he is getting enough for snacks. He continues to work full time at [employer] and feeling ok. CS  9/2022 MRI abdomen: The pancreas demonstrates pancreatic duct dilatation at the body and tail measuring up to 3 mm with an abrupt cut off at the pancreatic body similar to prior CT which could be related to a stone seen on the prior exam. No discrete pancreatic lesion is identified. long acting narcotics per Pain management   Monitor for opid withdrawal    Respiratory:  #. Acute hypoxemic respiratory distress: likely from pulmonary edema due to diastolic heart failure from hypertensive heart disease (ECHO showed grade II/IV diastolic LV dysfunction on 9/2016) to rule out aspiration pneumonia given recent history of AMS.   #. Acute pulmonary edema: CXR still demonstrating diffuse persistent bilateral alveolar infiltrates consistent with pulmonary edema, slightly improved; still requiring high oxygen volume.   #. ?Aspiration pneumonia: Leucocytosis, Spiked fever, pro calcitonne elevated, CXR with infiltrates bilaterally (more pulmonary edema than pneumonic infiltrates)  #. Obstructive sleep apnea  -On BiPAP on and off, lasix IV. CXR suggestive of pulmonary edema, BNP is elevated  -Continue bipap and diuresis  Wean her off bipap as tolerated, CPAP at night as tolerated  Continue cefepime and Levaquin for now  Duoneb and Albuterol puff PRN  Respiratory culture and gram stain  Blood culture if she spikes fever again.   Repeat ECHO  Control Hypertension  Pulmonary following patient, will appreciate recommendation    Cardiovascular:   #. HFpEF: Dyspnea, orthopnea, Pulmonary edema on CXR, elevated BNP, and ECHO in the past showing LV diastolic dysfunction. Long standing hypertension suspected to be the cause  #. Pulmonary edema likely from diastolic LV dysfunction  #. Hypertension, essential  #. Coronary artery disease    Continue PTA ASA/Atorvastatin/plavix;   Continue amlodipine 10 mg daily,   Increase metoprolol 100 mg daily  Increase hydralazin 25 mg TID  Hydralazin IV PRN  Continue clonidine 0.1 mg BID  Lasix 40 mg IV bid  Monitor Is and Os  Daily weight  ECHO was similar to previous finding    Abdomen:   On pentoprazole PO for ulcer prophylaxis    Renal:    #. Acute Kidney Injury- creatinine is up to 1.07 today, likely from diuresis  #. Hypokalemia: Improving, llevel is 3.6 today  Will replace  and monitor level  Likely from diuretic use  Telemetry monitoring    #. Rhabdomyolysis: Improved  level trended down with fluid support, has improved. Level checked last on 6/24 at 1093    #. Gout: On allopurinol    Endocrine:    #. Diabetes mellitus type 2  On lantus 15 unit nightly  Plus Novolin R SS    #. Hypothyroidism  Continue synthroid     ID:    #.  leucocytosis, fever, respiratory distress and recent history of AMS with CXR showing bilateral infiltrates. Possible aspiration pneumonia is considered  Will continue Cefepime and Levaquin  Monitor clinically  Repeat CXR    Miscellaneous:  Restless leg syndrome: On -pramipaxol    PLAN  Speech evaluation  Monitor for opoid withdrawal  Continue diuresis, cautiosly      Disposition: ICU care due to increased oxygen requirement    BEST PRACTICES:  - VTE prophylaxis: Heparin SQ  - SUP: Protonix  - Glycemic control: Lantuss and lispro  - LDA: PIV  - Nutrition: Cardiac diet  - Therapy/mobilization: PT/OT when stable    Objectively:   Visit Vitals  /73   Pulse 92   Temp 98.8 °F (37.1 °C) (Axillary)   Resp 27   Ht 5' 6\" (1.676 m)   Wt 113.8 kg   SpO2 94%   BMI 40.49 kg/m²     FiO2 (%):  [60 %-100 %] 80 %      I/O last 3 completed shifts:  In: 1133 [P.O.:550; I.V.:583]  Out: 3715 [Urine:3715]  I/O this shift:  In: -   Out: 100 [Urine:100]      Physical Exam   Constitutional: She is oriented to person, place, and time and well-developed, well-nourished,, in respiratory distress getting oxygen via nasal catheter  HENT: Pink, Non icteric  Cardiovascular: NS1 and S2 well heard no murmur or galop.  Pulmonary/Chest: She is in respiratory distress. Has inspiratory rales and rhonchi   Abdominal: Soft. Bowel sounds are normal. She exhibits no mass. There is no tenderness. There is no rebound and no guarding.   Musculoskeletal: Normal range of motion. She exhibits no edema.   Neurological: She is alert and oriented to person, place, and time.       Pertinent Reviewed:  Allergies, Medications, Labs, Imaging and Physician and Nursing Notes    Patient is discussed with Dr Jordi Ellis.     Manav Iyer MD  Internal [facility]  Pager [phone]    ===================================================================     I personally examined the patient, reviewed the data. Agree with above note.    Did not like to use BiPAP overnight  Responded well to diuresis.                   Assessment / Plan: Rahel Tavarez is a 66 year old female with  Anxiety  Dysthymic disorder  Insomnia  Chronic pain, fibromyalgia  Narcotic use: Often uses more then prescribed dose  Obesity  - Xanax low dose scheduled  - PRN oxycodone     Hypoxic resp failure  Aspiration pneumonia  Pulm edema  ALKA likely  - Cont diuresis  - Cont abx  - CPAP at night, if tolerated     Hypertension  Diastolic heart failure  - Blood pressure well controlled with increased hydralazine, BB     Acute renal failure  - Cont to monitor     Smoking cessation:   Hypothyroidism: cont synthroid    Critical Care time (Includes multiple room visit, interpretation of complex information, frequent communication with RN and other medical staff. Exclude any billable procedural time):  Andrei Ellis MD  [phone] (7 am to 7 pm)  [phone] (7 pm to 7 am)

## 2023-05-18 NOTE — HPI-TODAY'S VISIT:
5/18/2023 Mr. Kwaku Mallory is here for f/u of chronic pancreatitis.  Due to stones seen on CT scan last year, he had an ERCP and EUS with stenting last year. He had a repeat ERCP in May to remove the stent and stones. The stent was able to be removed but no stone was seen. Another stent was unable to be placed. At his last OV, he was feeling well. Today he continues to feel well. He is very careful with his diet. He has gained some weight. His insurance is no longer paying for zenpep and he is taking creon. He feels this is causing some constipation and would like to try a lower dose. He is working 5am-2pm at [employer] and overall feeling well. CS

## 2023-05-18 NOTE — PHYSICAL EXAM GASTROINTESTINAL
Abdomen , soft, nontender, nondistended , no guarding or rigidity , no masses palpable , normal bowel sounds , Liver and Spleen , no hepatosplenomegaly , liver nontender , spleen not palpable , Abdomen , soft, nontender, nondistended , no guarding or rigidity , no masses palpable , normal bowel sounds , Liver and Spleen , no hepatosplenomegaly , liver nontender , spleen not palpable

## 2023-05-18 NOTE — PHYSICAL EXAM CHEST:
breathing is unlabored without accessory muscle use, normal breath sounds ,  breathing is unlabored without accessory muscle use, normal breath sounds

## 2023-05-18 NOTE — PHYSICAL EXAM HENT:
Head,  normocephalic,  atraumatic,  Face,  Face within normal limits,  Ears,  External ears within normal limits,  Nose/Nasopharynx,  External nose  normal appearance,  Lips,  Appearance normal , Head,  normocephalic,  atraumatic,  Face,  Face within normal limits,  Ears,  External ears within normal limits,  Nose/Nasopharynx,  External nose  normal appearance,  Lips,  Appearance normal

## 2023-11-27 ENCOUNTER — OFFICE VISIT (OUTPATIENT)
Dept: URBAN - NONMETROPOLITAN AREA CLINIC 13 | Facility: CLINIC | Age: 38
End: 2023-11-27

## 2024-02-21 ENCOUNTER — LAB (OUTPATIENT)
Dept: URBAN - NONMETROPOLITAN AREA CLINIC 13 | Facility: CLINIC | Age: 39
End: 2024-02-21

## 2024-02-21 ENCOUNTER — OV EP (OUTPATIENT)
Dept: URBAN - NONMETROPOLITAN AREA CLINIC 13 | Facility: CLINIC | Age: 39
End: 2024-02-21
Payer: COMMERCIAL

## 2024-02-21 VITALS
BODY MASS INDEX: 23.07 KG/M2 | HEIGHT: 67 IN | DIASTOLIC BLOOD PRESSURE: 71 MMHG | WEIGHT: 147 LBS | SYSTOLIC BLOOD PRESSURE: 136 MMHG | HEART RATE: 59 BPM

## 2024-02-21 DIAGNOSIS — K86.89 CALCULUS OF PANCREATIC DUCT: ICD-10-CM

## 2024-02-21 DIAGNOSIS — K86.0 ALCOHOL-INDUCED CHRONIC PANCREATITIS: ICD-10-CM

## 2024-02-21 DIAGNOSIS — R79.89 ELEVATED LIVER FUNCTION TESTS: ICD-10-CM

## 2024-02-21 PROCEDURE — 99214 OFFICE O/P EST MOD 30 MIN: CPT | Performed by: NURSE PRACTITIONER

## 2024-02-21 RX ORDER — NITROFURANTOIN 25 MG/5ML
SUSPENSION ORAL
Qty: 0 | Refills: 0 | Status: ACTIVE | COMMUNITY
Start: 1900-01-01

## 2024-02-21 RX ORDER — PANCRELIPASE 24000; 76000; 120000 [USP'U]/1; [USP'U]/1; [USP'U]/1
2 WITH EACH MEAL AND 1 WITH EACH SNACK CAPSULE, DELAYED RELEASE PELLETS ORAL THREE TIMES A DAY
Qty: 240 | Refills: 11

## 2024-02-21 RX ORDER — PANCRELIPASE 24000; 76000; 120000 [USP'U]/1; [USP'U]/1; [USP'U]/1
2 WITH EACH MEAL AND 1 WITH EACH SNACK CAPSULE, DELAYED RELEASE PELLETS ORAL THREE TIMES A DAY
Qty: 240 | Refills: 11 | Status: ACTIVE | COMMUNITY
Start: 2023-05-12 | End: 2024-05-12

## 2024-02-21 RX ORDER — PANCRELIPASE LIPASE, PANCRELIPASE PROTEASE, PANCRELIPASE AMYLASE 20000; 63000; 84000 [USP'U]/1; [USP'U]/1; [USP'U]/1
2 PILLS WITH EACH MEAL AND ONE WITH EACH SNACK CAPSULE, DELAYED RELEASE ORAL
Qty: 240 | Refills: 11 | Status: ON HOLD | COMMUNITY

## 2024-02-21 NOTE — HPI-OTHER HISTORIES
August 22, 2018 The patient is a 33 year old /White male , who presents on referral from Ramya Taylor MD , for a gastroenterology 2nd opinion evaluation for established chronic pancreatitis. A copy of this document will be sent to the referring provider. The patient has symptoms of abdominal pain, weight loss, and diarrhea. Patient has not records from [place] present for me to review. States he has moderate to severe epogastric and left sided abdominal pain and nausea. Also with 2-5 loose stools daily.  He has not had a cholecystectomy, and has a prior use of alcohol as reviewed in social history.  The patient was diagnosed after presenting to [place] ED 12/2017. He states he has never been admitted to the hospital. ED referred him to [facility]. He states he had tests ordered including an ultrasound and bloodwork. He was placed on pancreatic enzymes but he states he is having worse abdominal pain. he admits drinking 1 beer last week. Last imaging was a CT through the ED 8/8. He has not had an EUS. He has not seen pain management- PMD giving him oxycodone for pain management. He notes being told he has a portal vein thrombus and is scheduled to see Dr Piedra about this on Monday.   7/16/2021 Mr. Kwaku Mallory is a 35 year old male here for chronic pancreatitis. He first started having pancreatitis in 2017. This was thought to be related to alcohol. In September 2018, he had severe hemorrhagic pancreatitis with fluid collections. He required IR embolization and drains. He has done well since this time. He has been sober for about a year. He is no longer on pancreatic enzymes. He denies any abdominal pain or diarrhea. He was hospatilized for sepsis in March. These outside records were reviewed. He had a CT scan at that time that scattered calcifications of the pancreas and mild pancreatic ductal dilatation. It also suggested a nodular liver, enlarged spleen and paraesophageal varices. He has never been told he has cirrhosis. His , AST 98, , bilirubin 0.3, and platelets 92.  Patient denies any family history of liver disease. Patient's family is local. He recalls being told in the past he had hepatitis. Patient denies any abdominal pain, swelling, mental changes, or increase in bleeding. He does bruise easily.    9/2021 EGD: no esophageal varices, mildly prominent veins in stomach possible gastric varices  12/1/2021 Mr. Kwaku Mallory is here for f/u of chronic pancreatitis and imaging suggestive of cirrhosis. He first started having pancreatitis in 2017. This was thought to be related to alcohol. In September 2018, he had severe hemorrhagic pancreatitis with fluid collections. He required IR embolization and drains. He has done well since this time.  He is no longer on pancreatic enzymes. He denies any abdominal pain or diarrhea. He has been sober for over a year. He was hospatilized for sepsis in March. He had a CT scan at that time that scattered calcifications of the pancreas and mild pancreatic ductal dilatation. It also suggested a nodular liver, enlarged spleen and paraesophageal varices. Chronic liver work up was ordered, repeat CT scan, and EGD for varix screening. He did not get the labs. HIs CT scan scan pancreatic ductal dilatation with a pancreatic ductal stone. There were no masses or lesions in the liver. There was no comment on the liver looking nodular or cirrhotic. Patient denies any abdominal pain, swelling, mental changes, or increase in bleeding. He does bruise easily. CS   12/17/2021 EUS: endoscopic changes suggesting mild to moderate chronic pancreatitis, pancreatic stone in body of pancreas, dilated duct in body 6mm, peripancreatic varices ERCP: tortuous narrowed proximal pancreatic duct concerning for stricture, stent placed 5mm x 5cm single pigtail placed  4/19/2022 Mr. Kwaku Mallory is here for f/u of chronic pancreatitis.  He first started having pancreatitis in 2017 secondary to alcohol. In September 2018, he had severe hemorrhagic pancreatitis with fluid collections. He required IR embolization and drains. In March 2021, he had a CT scan with scattered calcifications of the pancreas and mild pancreatic ductal dilatation. It also suggested a nodular liver, enlarged spleen and paraesophageal varices.  CT scan scan pancreatic ductal dilatation with a pancreatic ductal stone. There were no masses or lesions in the liver. EGD with mild promiment gastric varices. Chronic work up negative. US elastography negative for cirrhossis. Due to stones, ERCP and EUS was done with stenting. He then developed pancreatitis. He has been doing well since January. He denies any abdominal pain or jaundice. He is taking zenpep with only some of his meals. it  has a SE for constipation. he is working full time at [employer]. Overall, he is feeling well. CS  5/13/2022 ERCP: Removal of indwelling single pigtail pancreatic stent. Unsuccessful placement of stent due poor positioning and looping to proximal pancreatic duct  7/7/2022 Mr. Kwaku Mallory is here for f/u of chronic pancreatitis.  Due to stones seen on CT scan last year, he had an ERCP and EUS with stenting in January. He had a repeat ERCP in May to remove the stent and stones. The stent was able to be removed but no stone was seen. Another stent was able to be placed. Today, he is feeling ok. He denies any acute pancreatitis symptoms. He denies any abdominal pain, diarrhea, weight loss or jaundice. He is taking zenpep with meals. He does not think he is getting enough for snacks. He continues to work full time at [employer] and feeling ok. CS  9/2022 MRI abdomen: The pancreas demonstrates pancreatic duct dilatation at the body and tail measuring up to 3 mm with an abrupt cut off at the pancreatic body similar to prior CT which could be related to a stone seen on the prior exam. No discrete pancreatic lesion is identified.  5/18/2023 Mr. Kwaku Mallory is here for f/u of chronic pancreatitis. Due to stones seen on CT scan last year, he had an ERCP and EUS with stenting last year. He had a repeat ERCP in May to remove the stent and stones. The stent was able to be removed but no stone was seen. Another stent was unable to be placed. At his last OV, he was feeling well. Today he continues to feel well. He is very careful with his diet. He has gained some weight. His insurance is no longer paying for zenpep and he is taking creon. He feels this is causing some constipation and would like to try a lower dose. He is working 5am-2pm at [employer] and overall feeling well. CS  7/2023 MRI: small stones in the proximal pancreatic duct with mild dilation of the pancreatic duct distally. No mass of the pancreas

## 2024-02-21 NOTE — HPI-TODAY'S VISIT:
2/21/2024 Mr. Kwaku Mallory is here for f/u of chronic pancreatitis. He has been very careful with his diet and compliant with his creon. He has normal BM. He denies any abdominal pain, nausea, or vomiting. He did have some epgiastric pain after falling onto a box. This resovled after a few days. His weight is up 10 pounds. CS

## 2025-02-19 ENCOUNTER — OFFICE VISIT (OUTPATIENT)
Dept: URBAN - NONMETROPOLITAN AREA CLINIC 13 | Facility: CLINIC | Age: 40
End: 2025-02-19

## 2025-04-03 ENCOUNTER — LAB OUTSIDE AN ENCOUNTER (OUTPATIENT)
Dept: URBAN - NONMETROPOLITAN AREA CLINIC 13 | Facility: CLINIC | Age: 40
End: 2025-04-03

## 2025-04-03 ENCOUNTER — OFFICE VISIT (OUTPATIENT)
Dept: URBAN - NONMETROPOLITAN AREA CLINIC 13 | Facility: CLINIC | Age: 40
End: 2025-04-03
Payer: COMMERCIAL

## 2025-04-03 ENCOUNTER — DASHBOARD ENCOUNTERS (OUTPATIENT)
Age: 40
End: 2025-04-03

## 2025-04-03 DIAGNOSIS — K86.0 ALCOHOL-INDUCED CHRONIC PANCREATITIS: ICD-10-CM

## 2025-04-03 DIAGNOSIS — R79.89 ELEVATED LIVER FUNCTION TESTS: ICD-10-CM

## 2025-04-03 DIAGNOSIS — K86.89 CALCULUS OF PANCREATIC DUCT: ICD-10-CM

## 2025-04-03 PROCEDURE — 99214 OFFICE O/P EST MOD 30 MIN: CPT | Performed by: NURSE PRACTITIONER

## 2025-04-03 RX ORDER — PANCRELIPASE 24000; 76000; 120000 [USP'U]/1; [USP'U]/1; [USP'U]/1
2 WITH EACH MEAL AND 1 WITH EACH SNACK CAPSULE, DELAYED RELEASE PELLETS ORAL THREE TIMES A DAY
Qty: 240 | Refills: 11 | Status: ACTIVE | COMMUNITY

## 2025-04-03 RX ORDER — PANCRELIPASE LIPASE, PANCRELIPASE PROTEASE, PANCRELIPASE AMYLASE 20000; 63000; 84000 [USP'U]/1; [USP'U]/1; [USP'U]/1
2 PILLS WITH EACH MEAL AND ONE WITH EACH SNACK CAPSULE, DELAYED RELEASE ORAL
Qty: 240 | Refills: 11 | Status: ACTIVE | COMMUNITY
End: 2025-04-11

## 2025-04-03 RX ORDER — NITROFURANTOIN 25 MG/5ML
SUSPENSION ORAL
Qty: 0 | Refills: 0 | Status: ACTIVE | COMMUNITY
Start: 1900-01-01

## 2025-04-03 RX ORDER — PANCRELIPASE 24000; 76000; 120000 [USP'U]/1; [USP'U]/1; [USP'U]/1
2 WITH EACH MEAL AND 1 WITH EACH SNACK CAPSULE, DELAYED RELEASE PELLETS ORAL THREE TIMES A DAY
Qty: 240 | Refills: 11
Start: 2025-04-03

## 2025-04-03 RX ORDER — LEVOTHYROXINE SODIUM 50 UG/1
TABLET ORAL
Qty: 90 TABLET | Status: ACTIVE | COMMUNITY

## 2025-04-03 RX ORDER — VENLAFAXINE 225 MG/1
TABLET, EXTENDED RELEASE ORAL
Qty: 90 TABLET | Status: ACTIVE | COMMUNITY

## 2025-04-03 RX ORDER — PANCRELIPASE LIPASE, PANCRELIPASE PROTEASE, PANCRELIPASE AMYLASE 20000; 63000; 84000 [USP'U]/1; [USP'U]/1; [USP'U]/1
2 PILLS WITH EACH MEAL AND ONE WITH EACH SNACK CAPSULE, DELAYED RELEASE ORAL
Qty: 240 | Refills: 11

## 2025-04-03 NOTE — HPI-TODAY'S VISIT:
4/3/2025 Mr. Kwaku Mallory is here for f/u of chronic pancreatitis. He was last seen a year ago. He has been doing really well and busy working. He watches his diet and takes his zenpep. He eats a high protein but low fat diet. He has normal BM. He denies any abdominal pain, nausea, or vomiting. His weight is down slightly. CS

## 2025-04-03 NOTE — HPI-OTHER HISTORIES
August 22, 2018 The patient is a 33 year old /White male , who presents on referral from Ramya Taylor MD , for a gastroenterology 2nd opinion evaluation for established chronic pancreatitis. A copy of this document will be sent to the referring provider. The patient has symptoms of abdominal pain, weight loss, and diarrhea. Patient has not records from [place] present for me to review. States he has moderate to severe epogastric and left sided abdominal pain and nausea. Also with 2-5 loose stools daily.  He has not had a cholecystectomy, and has a prior use of alcohol as reviewed in social history.  The patient was diagnosed after presenting to [place] ED 12/2017. He states he has never been admitted to the hospital. ED referred him to [facility]. He states he had tests ordered including an ultrasound and bloodwork. He was placed on pancreatic enzymes but he states he is having worse abdominal pain. he admits drinking 1 beer last week. Last imaging was a CT through the ED 8/8. He has not had an EUS. He has not seen pain management- PMD giving him oxycodone for pain management. He notes being told he has a portal vein thrombus and is scheduled to see Dr Piedra about this on Monday.   7/16/2021 Mr. Kwaku Mallory is a 35 year old male here for chronic pancreatitis. He first started having pancreatitis in 2017. This was thought to be related to alcohol. In September 2018, he had severe hemorrhagic pancreatitis with fluid collections. He required IR embolization and drains. He has done well since this time. He has been sober for about a year. He is no longer on pancreatic enzymes. He denies any abdominal pain or diarrhea. He was hospatilized for sepsis in March. These outside records were reviewed. He had a CT scan at that time that scattered calcifications of the pancreas and mild pancreatic ductal dilatation. It also suggested a nodular liver, enlarged spleen and paraesophageal varices. He has never been told he has cirrhosis. His , AST 98, , bilirubin 0.3, and platelets 92.  Patient denies any family history of liver disease. Patient's family is local. He recalls being told in the past he had hepatitis. Patient denies any abdominal pain, swelling, mental changes, or increase in bleeding. He does bruise easily.    9/2021 EGD: no esophageal varices, mildly prominent veins in stomach possible gastric varices  12/1/2021 Mr. Kwaku Mallory is here for f/u of chronic pancreatitis and imaging suggestive of cirrhosis. He first started having pancreatitis in 2017. This was thought to be related to alcohol. In September 2018, he had severe hemorrhagic pancreatitis with fluid collections. He required IR embolization and drains. He has done well since this time.  He is no longer on pancreatic enzymes. He denies any abdominal pain or diarrhea. He has been sober for over a year. He was hospatilized for sepsis in March. He had a CT scan at that time that scattered calcifications of the pancreas and mild pancreatic ductal dilatation. It also suggested a nodular liver, enlarged spleen and paraesophageal varices. Chronic liver work up was ordered, repeat CT scan, and EGD for varix screening. He did not get the labs. HIs CT scan scan pancreatic ductal dilatation with a pancreatic ductal stone. There were no masses or lesions in the liver. There was no comment on the liver looking nodular or cirrhotic. Patient denies any abdominal pain, swelling, mental changes, or increase in bleeding. He does bruise easily. CS   12/17/2021 EUS: endoscopic changes suggesting mild to moderate chronic pancreatitis, pancreatic stone in body of pancreas, dilated duct in body 6mm, peripancreatic varices ERCP: tortuous narrowed proximal pancreatic duct concerning for stricture, stent placed 5mm x 5cm single pigtail placed  4/19/2022 Mr. Kwaku Mallory is here for f/u of chronic pancreatitis.  He first started having pancreatitis in 2017 secondary to alcohol. In September 2018, he had severe hemorrhagic pancreatitis with fluid collections. He required IR embolization and drains. In March 2021, he had a CT scan with scattered calcifications of the pancreas and mild pancreatic ductal dilatation. It also suggested a nodular liver, enlarged spleen and paraesophageal varices.  CT scan scan pancreatic ductal dilatation with a pancreatic ductal stone. There were no masses or lesions in the liver. EGD with mild promiment gastric varices. Chronic work up negative. US elastography negative for cirrhossis. Due to stones, ERCP and EUS was done with stenting. He then developed pancreatitis. He has been doing well since January. He denies any abdominal pain or jaundice. He is taking zenpep with only some of his meals. it  has a SE for constipation. he is working full time at [employer]. Overall, he is feeling well. CS  5/13/2022 ERCP: Removal of indwelling single pigtail pancreatic stent. Unsuccessful placement of stent due poor positioning and looping to proximal pancreatic duct  7/7/2022 Mr. Kwaku Mallory is here for f/u of chronic pancreatitis.  Due to stones seen on CT scan last year, he had an ERCP and EUS with stenting in January. He had a repeat ERCP in May to remove the stent and stones. The stent was able to be removed but no stone was seen. Another stent was able to be placed. Today, he is feeling ok. He denies any acute pancreatitis symptoms. He denies any abdominal pain, diarrhea, weight loss or jaundice. He is taking zenpep with meals. He does not think he is getting enough for snacks. He continues to work full time at [employer] and feeling ok. CS  9/2022 MRI abdomen: The pancreas demonstrates pancreatic duct dilatation at the body and tail measuring up to 3 mm with an abrupt cut off at the pancreatic body similar to prior CT which could be related to a stone seen on the prior exam. No discrete pancreatic lesion is identified.  5/18/2023 Mr. Kwaku Mallory is here for f/u of chronic pancreatitis. Due to stones seen on CT scan last year, he had an ERCP and EUS with stenting last year. He had a repeat ERCP in May to remove the stent and stones. The stent was able to be removed but no stone was seen. Another stent was unable to be placed. At his last OV, he was feeling well. Today he continues to feel well. He is very careful with his diet. He has gained some weight. His insurance is no longer paying for zenpep and he is taking creon. He feels this is causing some constipation and would like to try a lower dose. He is working 5am-2pm at [employer] and overall feeling well. CS  7/2023 MRI: small stones in the proximal pancreatic duct with mild dilation of the pancreatic duct distally. No mass of the pancreas 2/21/2024 Mr. Kwaku Mallory is here for f/u of chronic pancreatitis. He has been very careful with his diet and compliant with his creon. He has normal BM. He denies any abdominal pain, nausea, or vomiting. He did have some epgiastric pain after falling onto a box. This resovled after a few days. His weight is up 10 pounds. CS

## 2025-04-09 ENCOUNTER — TELEPHONE ENCOUNTER (OUTPATIENT)
Dept: URBAN - NONMETROPOLITAN AREA CLINIC 2 | Facility: CLINIC | Age: 40
End: 2025-04-09

## 2025-05-13 ENCOUNTER — ERX REFILL RESPONSE (OUTPATIENT)
Dept: URBAN - NONMETROPOLITAN AREA CLINIC 13 | Facility: CLINIC | Age: 40
End: 2025-05-13

## 2025-05-13 RX ORDER — PANCRELIPASE LIPASE, PANCRELIPASE PROTEASE, PANCRELIPASE AMYLASE 20000; 63000; 84000 [USP'U]/1; [USP'U]/1; [USP'U]/1
2 CAPSULES WITH MEALS AND 1 WITH SNACKS CAPSULE, DELAYED RELEASE ORAL
Qty: 900 CAPSULES | Refills: 3 | OUTPATIENT

## 2025-05-13 RX ORDER — PANCRELIPASE LIPASE, PANCRELIPASE PROTEASE, PANCRELIPASE AMYLASE 20000; 63000; 84000 [USP'U]/1; [USP'U]/1; [USP'U]/1
2 PILLS WITH EACH MEAL AND ONE WITH EACH SNACK CAPSULE, DELAYED RELEASE ORAL
Qty: 240 | Refills: 11 | OUTPATIENT

## 2025-05-21 ENCOUNTER — ERX REFILL RESPONSE (OUTPATIENT)
Dept: URBAN - NONMETROPOLITAN AREA CLINIC 13 | Facility: CLINIC | Age: 40
End: 2025-05-21

## 2025-05-21 RX ORDER — PANCRELIPASE LIPASE, PANCRELIPASE PROTEASE, PANCRELIPASE AMYLASE 20000; 63000; 84000 [USP'U]/1; [USP'U]/1; [USP'U]/1
TAKE 2 CAPSULES WITH MEALS AND 1 WITH SNACKS ORALLY 5 TIMES DAILY CAPSULE, DELAYED RELEASE ORAL
Qty: 900 | Refills: 3 | OUTPATIENT

## 2025-08-29 ENCOUNTER — WEB ENCOUNTER (OUTPATIENT)
Dept: URBAN - NONMETROPOLITAN AREA CLINIC 2 | Facility: CLINIC | Age: 40
End: 2025-08-29